# Patient Record
Sex: FEMALE | Race: WHITE | URBAN - METROPOLITAN AREA
[De-identification: names, ages, dates, MRNs, and addresses within clinical notes are randomized per-mention and may not be internally consistent; named-entity substitution may affect disease eponyms.]

---

## 2017-04-17 ENCOUNTER — HISTORICAL (OUTPATIENT)
Dept: RADIOLOGY | Facility: HOSPITAL | Age: 42
End: 2017-04-17

## 2017-06-23 ENCOUNTER — HISTORICAL (OUTPATIENT)
Dept: INFUSION THERAPY | Facility: HOSPITAL | Age: 42
End: 2017-06-23

## 2017-08-25 ENCOUNTER — HISTORICAL (OUTPATIENT)
Dept: INFUSION THERAPY | Facility: HOSPITAL | Age: 42
End: 2017-08-25

## 2017-11-21 ENCOUNTER — HISTORICAL (OUTPATIENT)
Dept: LAB | Facility: HOSPITAL | Age: 42
End: 2017-11-21

## 2017-11-21 LAB
ABS NEUT (OLG): 5.16 X10(3)/MCL (ref 2.1–9.2)
ALBUMIN SERPL-MCNC: 3.9 GM/DL (ref 3.4–5)
ALBUMIN/GLOB SERPL: 1.3 {RATIO}
ALP SERPL-CCNC: 71 UNIT/L (ref 38–126)
ALT SERPL-CCNC: 27 UNIT/L (ref 12–78)
APPEARANCE, UA: CLEAR
APTT PPP: 27 SECOND(S) (ref 24.8–36.9)
AST SERPL-CCNC: 12 UNIT/L (ref 15–37)
BACTERIA SPEC CULT: ABNORMAL /HPF
BASOPHILS # BLD AUTO: 0.1 X10(3)/MCL (ref 0–0.2)
BASOPHILS NFR BLD AUTO: 1 %
BILIRUB SERPL-MCNC: 0.4 MG/DL (ref 0.2–1)
BILIRUB UR QL STRIP: NEGATIVE
BILIRUBIN DIRECT+TOT PNL SERPL-MCNC: 0.1 MG/DL (ref 0–0.2)
BILIRUBIN DIRECT+TOT PNL SERPL-MCNC: 0.3 MG/DL (ref 0–0.8)
BUN SERPL-MCNC: 9 MG/DL (ref 7–18)
CALCIUM SERPL-MCNC: 9 MG/DL (ref 8.5–10.1)
CHLORIDE SERPL-SCNC: 104 MMOL/L (ref 98–107)
CO2 SERPL-SCNC: 27 MMOL/L (ref 21–32)
COLOR UR: YELLOW
CREAT SERPL-MCNC: 0.73 MG/DL (ref 0.55–1.02)
EOSINOPHIL # BLD AUTO: 0.3 X10(3)/MCL (ref 0–0.9)
EOSINOPHIL NFR BLD AUTO: 4 %
ERYTHROCYTE [DISTWIDTH] IN BLOOD BY AUTOMATED COUNT: 15.4 % (ref 11.5–17)
GLOBULIN SER-MCNC: 3 GM/DL (ref 2.4–3.5)
GLUCOSE (UA): NEGATIVE
GLUCOSE SERPL-MCNC: 93 MG/DL (ref 74–106)
HCT VFR BLD AUTO: 34.7 % (ref 37–47)
HGB BLD-MCNC: 10.7 GM/DL (ref 12–16)
HGB UR QL STRIP: NEGATIVE
INR PPP: 1.04 (ref 0–1.27)
KETONES UR QL STRIP: NEGATIVE
LEUKOCYTE ESTERASE UR QL STRIP: NEGATIVE
LYMPHOCYTES # BLD AUTO: 1.8 X10(3)/MCL (ref 0.6–4.6)
LYMPHOCYTES NFR BLD AUTO: 23 %
MCH RBC QN AUTO: 25.6 PG (ref 27–31)
MCHC RBC AUTO-ENTMCNC: 30.8 GM/DL (ref 33–36)
MCV RBC AUTO: 83 FL (ref 80–94)
MONOCYTES # BLD AUTO: 0.5 X10(3)/MCL (ref 0.1–1.3)
MONOCYTES NFR BLD AUTO: 6 %
NEUTROPHILS # BLD AUTO: 5.16 X10(3)/MCL (ref 2.1–9.2)
NEUTROPHILS NFR BLD AUTO: 66 %
NITRITE UR QL STRIP: NEGATIVE
PH UR STRIP: 7.5 [PH] (ref 5–9)
PLATELET # BLD AUTO: 272 X10(3)/MCL (ref 130–400)
PMV BLD AUTO: 12.1 FL (ref 9.4–12.4)
POTASSIUM SERPL-SCNC: 3.9 MMOL/L (ref 3.5–5.1)
PROT SERPL-MCNC: 6.9 GM/DL (ref 6.4–8.2)
PROT UR QL STRIP: NEGATIVE
PROTHROMBIN TIME: 13.9 SECOND(S) (ref 12.2–14.7)
RBC # BLD AUTO: 4.18 X10(6)/MCL (ref 4.2–5.4)
RBC #/AREA URNS HPF: ABNORMAL /[HPF]
SODIUM SERPL-SCNC: 138 MMOL/L (ref 136–145)
SP GR UR STRIP: 1.01 (ref 1–1.03)
SQUAMOUS EPITHELIAL, UA: ABNORMAL
UROBILINOGEN UR STRIP-ACNC: 0.2
WBC # SPEC AUTO: 7.9 X10(3)/MCL (ref 4.5–11.5)
WBC #/AREA URNS HPF: ABNORMAL /[HPF]

## 2017-11-22 ENCOUNTER — HISTORICAL (OUTPATIENT)
Dept: ADMINISTRATIVE | Facility: HOSPITAL | Age: 42
End: 2017-11-22

## 2017-11-22 LAB — POC BETA-HCG (QUAL): NEGATIVE

## 2018-04-16 ENCOUNTER — HISTORICAL (OUTPATIENT)
Dept: RADIOLOGY | Facility: HOSPITAL | Age: 43
End: 2018-04-16

## 2019-04-16 ENCOUNTER — HISTORICAL (OUTPATIENT)
Dept: RADIOLOGY | Facility: HOSPITAL | Age: 44
End: 2019-04-16

## 2020-11-20 ENCOUNTER — HISTORICAL (OUTPATIENT)
Dept: RADIOLOGY | Facility: HOSPITAL | Age: 45
End: 2020-11-20

## 2021-02-12 ENCOUNTER — HISTORICAL (OUTPATIENT)
Dept: RADIOLOGY | Facility: HOSPITAL | Age: 46
End: 2021-02-12

## 2021-05-27 LAB
ABS NEUT (OLG): 5.71 X10(3)/MCL (ref 2.1–9.2)
ALBUMIN SERPL-MCNC: 4.3 GM/DL (ref 3.5–5)
ALP SERPL-CCNC: 68 UNIT/L (ref 40–150)
ALT SERPL-CCNC: 17 UNIT/L (ref 0–55)
AST SERPL-CCNC: 16 UNIT/L (ref 5–34)
BASOPHILS # BLD AUTO: 0.1 X10(3)/MCL (ref 0–0.2)
BASOPHILS NFR BLD AUTO: 1 %
BILIRUB SERPL-MCNC: 0.5 MG/DL
BILIRUBIN DIRECT+TOT PNL SERPL-MCNC: 0.2 MG/DL (ref 0–0.5)
BILIRUBIN DIRECT+TOT PNL SERPL-MCNC: 0.3 MG/DL (ref 0–0.8)
EOSINOPHIL # BLD AUTO: 0.4 X10(3)/MCL (ref 0–0.9)
EOSINOPHIL NFR BLD AUTO: 5 %
ERYTHROCYTE [DISTWIDTH] IN BLOOD BY AUTOMATED COUNT: 13.2 % (ref 11.5–17)
GROUP & RH: NORMAL
HCT VFR BLD AUTO: 38 % (ref 37–47)
HGB BLD-MCNC: 11.9 GM/DL (ref 12–16)
LIVER PROFILE INTERP: NORMAL
LYMPHOCYTES # BLD AUTO: 1.7 X10(3)/MCL (ref 0.6–4.6)
LYMPHOCYTES NFR BLD AUTO: 20 %
MCH RBC QN AUTO: 28.3 PG (ref 27–31)
MCHC RBC AUTO-ENTMCNC: 31.3 GM/DL (ref 33–36)
MCV RBC AUTO: 90.3 FL (ref 80–94)
MONOCYTES # BLD AUTO: 0.5 X10(3)/MCL (ref 0.1–1.3)
MONOCYTES NFR BLD AUTO: 5 %
NEUTROPHILS # BLD AUTO: 5.71 X10(3)/MCL (ref 2.1–9.2)
NEUTROPHILS NFR BLD AUTO: 67 %
PLATELET # BLD AUTO: 240 X10(3)/MCL (ref 130–400)
PMV BLD AUTO: 12.1 FL (ref 9.4–12.4)
PROT SERPL-MCNC: 6.8 GM/DL (ref 6.4–8.3)
RBC # BLD AUTO: 4.21 X10(6)/MCL (ref 4.2–5.4)
WBC # SPEC AUTO: 8.5 X10(3)/MCL (ref 4.5–11.5)

## 2021-06-03 ENCOUNTER — HISTORICAL (OUTPATIENT)
Dept: RADIOLOGY | Facility: HOSPITAL | Age: 46
End: 2021-06-03

## 2021-06-04 ENCOUNTER — HISTORICAL (OUTPATIENT)
Dept: SURGERY | Facility: HOSPITAL | Age: 46
End: 2021-06-04

## 2021-10-19 ENCOUNTER — HISTORICAL (OUTPATIENT)
Dept: RADIOLOGY | Facility: HOSPITAL | Age: 46
End: 2021-10-19

## 2022-04-07 ENCOUNTER — HISTORICAL (OUTPATIENT)
Dept: ADMINISTRATIVE | Facility: HOSPITAL | Age: 47
End: 2022-04-07

## 2022-04-24 VITALS — DIASTOLIC BLOOD PRESSURE: 77 MMHG | SYSTOLIC BLOOD PRESSURE: 121 MMHG

## 2022-04-30 NOTE — OP NOTE
Patient:   Rebeca Bryan            MRN: [id number]            FIN: [id number]               Age:   42 years     Sex:  Female     :  1975   Associated Diagnoses:   None   Author:   Noel Talley IV, MD      Brief Operative Note   Operative Information   Date/ Time:  2017 07:21:00.     Preoperative Diagnosis (End of life of mediport).     Postoperative Diagnosis (same).     Procedures Performed (Removal of mediport).     Surgeon: Noel Talley IV, MD.

## 2022-04-30 NOTE — OP NOTE
DATE OF SURGERY:    11/22/2017    SURGEON:  Noel Talley IV, MD    PREOPERATIVE DIAGNOSIS:  End of life of Mediport.    POSTOPERATIVE DIAGNOSIS:  End of life of Mediport.    PROCEDURE:  Removal of Mediport.       Anesthesia:  Local with IV sedation.    PROCEDURE IN DETAIL:  The patient was taken to the operating room under informed consent and placed on the table in the supine position.  The left anterior chest was prepped and draped in usual sterile fashion.  Under IV sedation 1% lidocaine infiltrated over the Mediport.  Incision made over the top of the Mediport, carried down to the pocket.  The stay sutures were cut and removed, the Mediport removed from the pocket.  Pressure held.  Once hemostasis was assured the wound closed with a running 2-0 Vicryl and the skin with a 3-0 subcuticular stitch.  The patient tolerated this procedure well and left the operating room in stable condition.        ______________________________  MD ALEJANDRINA Campos IV/NANCIE  DD:  11/22/2017  Time:  07:24AM  DT:  11/22/2017  Time:  01:47PM  Job #:  [id number]

## 2022-04-30 NOTE — OP NOTE
DATE OF SURGERY:    06/04/2021    SURGEON:  MIKAYLA Flynn MD    PREOPERATIVE DIAGNOSES:    1. Supraclavicular lymphadenopathy.  2. History of Hodgkin lymphoma.    POSTOPERATIVE DIAGNOSES:    1. Supraclavicular lymphadenopathy.  2. History of Hodgkin lymphoma.    OPERATION:    1. Excisional lymph node biopsy, left supraclavicular neck.  2. Magnetic seed localization of supraclavicular lymph node of concern.  3. Intraoperative ultrasound with surgeon interpretation.    SURGEON:  Jane Flynn MD    ASSISTANT:  OR staff.    DATE OF PROCEDURE:  June 4, 2021.    COMPLICATIONS:  None.    FINDINGS:    1. Mag seed localization of lymph node of concern inferior to prior neck incision.  2. Ultrasound-confirmed mag seed localized node within this location.  3. Node removed without complication with essentially normal anatomic findings.    OPERATIVE REPORT:  Patient was brought to the OR, placed in the supine position.  Anesthesia was induced and LMA was placed for airway control.  The patient's left neck and chest were prepped and draped in sterile manner.  Magnetic detector was used to identify location of the lymph node that was localized yesterday.  This was identified inferior to the prior mediastinoscopy incision site.  Ultrasound was used to confirm location of this node otherwise.  A vertical incision was created overlying the area of interest.  Subcutaneous tissue was dissected.  Platysma was entered.  Nearby sensory nerves were identified and preserved, and otherwise the lymph node was palpable and targeted with the magnetic detector probe.  This was fully dissected.  The lymphovascular pedicle was ligated with clips, and the lymph node was completely retrieved whole.  This was measuring close to about a cm in size, and submitted to pathology with the mag seed.  Otherwise, hemostasis was ensured.  There was no more magnetic detection within the field, and the platysma was closed with a 3-0 Vicryl  stitch in a running manner.  Dermis was closed with a 3-0 Vicryl in a simple interrupted manner and skin was closed with 4-0 Monocryl in a subcuticular manner.  The patient tolerated the procedure well.  There were no complications.        ______________________________  M. Jane Flynn MD    [facility] – [place]  DD:  06/04/2021  Time:  12:59PM  DT:  06/04/2021  Time:  01:30PM  Job #:  [id number]    cc: Dr. Sriram Aguilar